# Patient Record
Sex: MALE | Race: OTHER | HISPANIC OR LATINO | URBAN - METROPOLITAN AREA
[De-identification: names, ages, dates, MRNs, and addresses within clinical notes are randomized per-mention and may not be internally consistent; named-entity substitution may affect disease eponyms.]

---

## 2021-11-02 ENCOUNTER — EMERGENCY (EMERGENCY)
Facility: HOSPITAL | Age: 36
LOS: 1 days | Discharge: HOME | End: 2021-11-02
Attending: EMERGENCY MEDICINE
Payer: SELF-PAY

## 2021-11-02 VITALS
DIASTOLIC BLOOD PRESSURE: 79 MMHG | HEART RATE: 62 BPM | OXYGEN SATURATION: 98 % | TEMPERATURE: 98 F | RESPIRATION RATE: 18 BRPM | SYSTOLIC BLOOD PRESSURE: 121 MMHG

## 2021-11-02 PROCEDURE — 99053 MED SERV 10PM-8AM 24 HR FAC: CPT

## 2021-11-02 PROCEDURE — 99284 EMERGENCY DEPT VISIT MOD MDM: CPT

## 2021-11-02 NOTE — ED ADULT TRIAGE NOTE - CHIEF COMPLAINT QUOTE
Ambulatory s/p fall at work and landing on his L ribs at 4p today. Patient denies LOC, hitting his head, anticoagulation. States that he has increased pain on inspiration. Has not medicated for pain. No redness, swelling, s/s injury/trauma to the area. -hold metformin  -humalog ISS, monitor FS  -A1c 8.6%

## 2021-11-03 VITALS
RESPIRATION RATE: 19 BRPM | SYSTOLIC BLOOD PRESSURE: 122 MMHG | HEART RATE: 78 BPM | OXYGEN SATURATION: 100 % | DIASTOLIC BLOOD PRESSURE: 80 MMHG | TEMPERATURE: 97 F

## 2021-11-03 PROCEDURE — 99283 EMERGENCY DEPT VISIT LOW MDM: CPT | Mod: 25

## 2021-11-03 PROCEDURE — T1013: CPT

## 2021-11-03 PROCEDURE — 71101 X-RAY EXAM UNILAT RIBS/CHEST: CPT

## 2021-11-03 PROCEDURE — 71101 X-RAY EXAM UNILAT RIBS/CHEST: CPT | Mod: 26

## 2021-11-03 RX ORDER — ACETAMINOPHEN 500 MG
975 TABLET ORAL ONCE
Refills: 0 | Status: DISCONTINUED | OUTPATIENT
Start: 2021-11-03 | End: 2021-11-07

## 2021-11-03 NOTE — ED PROVIDER NOTE - ADDITIONAL NOTES AND INSTRUCTIONS:
Please excuse Shade Patel from work through 11/6/21. He was seen at [facility] on 11/3/21 and treated for a medical condition.

## 2021-11-03 NOTE — ED PROVIDER NOTE - NSFOLLOWUPINSTRUCTIONS_ED_ALL_ED_FT
Dolor en la pared torácica    Chest Wall Pain      El dolor en la pared torácica se produce en los huesos y los músculos del pecho o alrededor de estos. A veces, radha lesión causa gauri dolor. La tos en exceso o el uso excesivo de los músculos de los brazos y del pecho también pueden causar dolor en la pared torácica. En ocasiones, la causa puede ser desconocida. Gauri dolor puede durar varias semanas.      Siga estas indicaciones en more casa:      Control del dolor, la rigidez y la hinchazón    •Si se lo indican, aplique hielo sobre la nabeel del dolor:  •Ponga el hielo en radha bolsa plástica.      •Coloque radha toalla entre la piel y la bolsa.      •Coloque el hielo jeramy 20 minutos, 2 a 3 veces al día.        Actividad     •Renzo reposo buster se lo haya indicado el médico.      •Evite las actividades que le causan dolor. Estas pueden ser aquellas que requieren el uso de los músculos del tórax, los abdominales o los laterales para levantar objetos pesados. Pregúntele al médico qué actividades son seguras para usted.        Indicaciones generales      •[employer] los medicamentos de venta jenn y los recetados solamente buster se lo haya indicado el médico.      • No consuma ningún producto que contenga nicotina o tabaco, buster cigarrillos, cigarrillos electrónicos y tabaco de mascar. Estos pueden retrasar la cicatrización después de radha lesión. Si necesita ayuda para dejar de fumar, consulte al médico.      •Concurra a todas las visitas de seguimiento buster se lo haya indicado el médico. [place] es importante.        Comuníquese con un médico si:    •Tiene fiebre.      •El dolor de pecho empeora.      •Aparecen nuevos síntomas.        Solicite ayuda inmediatamente si:    •Tiene náuseas o vómitos.      •Transpira o tiene sensación de desvanecimiento.      •Tiene tos con mucosidad de los pulmones (esputo) o expectora chance al toser.      •Le falta el aire.      Estos síntomas pueden representar un problema grave que constituye radha emergencia. No espere a gilles si los síntomas desaparecen. Solicite atención médica de inmediato. Comuníquese con el servicio de emergencias de more localidad (911 en los [place]). No conduzca por halina propios medios hasta el hospital.       Resumen    •El dolor en la pared torácica se produce en los huesos y los músculos del pecho o alrededor de estos.      •Según la causa, el tratamiento consistirá en la aplicación de hielo, reposo, medicamentos y la suspensión de las actividades que causan dolor.      •Comuníquese con un médico si tiene fiebre, dolor en el pecho que empeora o síntomas nuevos.      •Solicite ayuda de inmediato si siente que se va a desvanecer o le falta el aire. Estos síntomas pueden indicar radha emergencia.      Esta información no tiene buster fin reemplazar el consejo del médico. Asegúrese de hacerle al médico cualquier pregunta que tenga.

## 2021-11-03 NOTE — ED PROVIDER NOTE - PATIENT PORTAL LINK FT
You can access the FollowMyHealth Patient Portal offered by [facility] by registering at the following website: [email or web address]. By joining [facility]’s FollowMyHealth portal, you will also be able to view your health information using other applications (apps) compatible with our system.

## 2021-11-03 NOTE — ED PROVIDER NOTE - OBJECTIVE STATEMENT
35 yo male no major PMHx presents s/p incident in which he was walking in an attic at a work site, had his foot get stuck in the floor which caused him to fall against his left side body. The patient states that since the incident, he has been experiencing left sided rib pain. His pain is worsened by inspiration. The patient has not taken any medication for his pain. He is denying pain elsewhere at this time. Denies other trauma at this time.

## 2021-11-03 NOTE — ED PROVIDER NOTE - PHYSICAL EXAMINATION
Const: Awake, alert and oriented. In no acute distress. Well appearing.  HEENT: NC/AT. Moist mucous membranes.  Eyes: No scleral icterus. EOMI.  Neck:. Soft and supple. Full ROM without pain.  Cardiac: Regular rate and regular rhythm. +S1/S2. Peripheral pulses 2+ and symmetric. No LE edema.  Resp: Speaking in full sentences. No evidence of respiratory distress. No wheezes, rales or rhonchi.  Abd: Soft, non-tender, non-distended. Normal bowel sounds in all 4 quadrants. No guarding or rebound.  Back: Spine midline and non-tender. No CVAT.  Musculoskeletal: TTP over left side rib cage.   Skin: No rashes, abrasions or lacerations.  Lymph: No cervical lymphadenopathy.  Neuro: Awake, alert & oriented x 3. Moves all extremities symmetrically.

## 2021-11-03 NOTE — ED ADULT NURSE NOTE - CHIEF COMPLAINT QUOTE
Ambulatory s/p fall at work and landing on his L ribs at 4p today. Patient denies LOC, hitting his head, anticoagulation. States that he has increased pain on inspiration. Has not medicated for pain. No redness, swelling, s/s injury/trauma to the area.

## 2021-11-03 NOTE — ED PROVIDER NOTE - CLINICAL SUMMARY MEDICAL DECISION MAKING FREE TEXT BOX
35 yo male s/p incident in which he injured his left side ribs after falling. Will prescribe pain medication, obtain XR.

## 2021-11-03 NOTE — ED PROVIDER NOTE - ATTENDING CONTRIBUTION TO CARE
I personally saw the patient with the resident, and completed the key components of the history and physical exam. I then discussed the management plan with the resident.   gen in nad resp clear cardiac no murmur abd soft bneuro intact msk + ttp lefty lateral chjest wall no flail chest   agree with resident plan of care